# Patient Record
Sex: MALE | URBAN - METROPOLITAN AREA
[De-identification: names, ages, dates, MRNs, and addresses within clinical notes are randomized per-mention and may not be internally consistent; named-entity substitution may affect disease eponyms.]

---

## 2021-02-05 ENCOUNTER — OFFICE VISIT (OUTPATIENT)
Dept: URBAN - METROPOLITAN AREA CLINIC 33 | Facility: CLINIC | Age: 61
End: 2021-02-05
Payer: COMMERCIAL

## 2021-02-05 PROCEDURE — 92002 INTRM OPH EXAM NEW PATIENT: CPT | Performed by: OPHTHALMOLOGY

## 2021-02-05 NOTE — IMPRESSION/PLAN
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 Condition: quality of life issue. Symptoms: will treat with meds. Plan: Discussed diagnosis in detail with patient. Discussed treatment options with patient. Current therapy is best for patient. Continue using current medication(s). Continue Polymycin gtts Q6H OS. Continue Tobrex niesha QHS OS. Stressed compliance. Observe.

## 2021-02-12 ENCOUNTER — OFFICE VISIT (OUTPATIENT)
Dept: URBAN - METROPOLITAN AREA CLINIC 33 | Facility: CLINIC | Age: 61
End: 2021-02-12
Payer: COMMERCIAL

## 2021-02-12 DIAGNOSIS — H16.012 CENTRAL CORNEAL ULCER, LEFT EYE: Primary | ICD-10-CM

## 2021-02-12 PROCEDURE — 99212 OFFICE O/P EST SF 10 MIN: CPT | Performed by: OPHTHALMOLOGY

## 2021-02-12 ASSESSMENT — INTRAOCULAR PRESSURE
OD: 15
OS: 15